# Patient Record
Sex: MALE | Race: WHITE | URBAN - METROPOLITAN AREA
[De-identification: names, ages, dates, MRNs, and addresses within clinical notes are randomized per-mention and may not be internally consistent; named-entity substitution may affect disease eponyms.]

---

## 2019-06-11 ENCOUNTER — OFFICE VISIT (OUTPATIENT)
Dept: FAMILY MEDICINE CLINIC | Facility: CLINIC | Age: 54
End: 2019-06-11
Payer: COMMERCIAL

## 2019-06-11 VITALS
HEART RATE: 96 BPM | TEMPERATURE: 99 F | OXYGEN SATURATION: 98 % | WEIGHT: 251.38 LBS | SYSTOLIC BLOOD PRESSURE: 130 MMHG | DIASTOLIC BLOOD PRESSURE: 82 MMHG | HEIGHT: 69.5 IN | BODY MASS INDEX: 36.39 KG/M2

## 2019-06-11 DIAGNOSIS — I10 ESSENTIAL HYPERTENSION: ICD-10-CM

## 2019-06-11 DIAGNOSIS — Z00.00 ROUTINE GENERAL MEDICAL EXAMINATION AT A HEALTH CARE FACILITY: Primary | ICD-10-CM

## 2019-06-11 DIAGNOSIS — Z12.11 SCREENING FOR COLON CANCER: ICD-10-CM

## 2019-06-11 DIAGNOSIS — G47.09 OTHER INSOMNIA: ICD-10-CM

## 2019-06-11 PROCEDURE — 99203 OFFICE O/P NEW LOW 30 MIN: CPT | Performed by: FAMILY MEDICINE

## 2019-06-11 PROCEDURE — 99386 PREV VISIT NEW AGE 40-64: CPT | Performed by: FAMILY MEDICINE

## 2019-06-11 RX ORDER — LISINOPRIL AND HYDROCHLOROTHIAZIDE 20; 12.5 MG/1; MG/1
1 TABLET ORAL
Qty: 90 TABLET | Refills: 1 | Status: SHIPPED | OUTPATIENT
Start: 2019-06-11 | End: 2021-11-24

## 2019-06-11 RX ORDER — LISINOPRIL AND HYDROCHLOROTHIAZIDE 20; 12.5 MG/1; MG/1
1 TABLET ORAL
Refills: 1 | COMMUNITY
Start: 2019-06-05 | End: 2019-06-11

## 2019-06-11 RX ORDER — TRAZODONE HYDROCHLORIDE 50 MG/1
TABLET ORAL
Qty: 90 TABLET | Refills: 0 | Status: SHIPPED | OUTPATIENT
Start: 2019-06-11 | End: 2021-11-24

## 2019-06-11 NOTE — PATIENT INSTRUCTIONS
-- come in for fasting bloodwork anytime that you are able to (8-10h no food, only water; lab here is open M-F, 8am-12)  -- go to [facility]  -- we will call with results about 5-7 days after bloodwork is completed     -when at [address] [place] [address], you can  stool ca

## 2019-06-11 NOTE — PROGRESS NOTES
Boone Dawson is a 48year old male who is here for Patient presents with:  Hypertension: Patient got dx: HTN 06/05/2019 and is here for follow up.  He refused Colonoscopy, he agreed to get Hemoccult done       HPI:     HTN  -tolerating meds  -diagnosed 1 SYSTEMS:     GENERAL HEALTH: feels well otherwise.  No f/c  NEURO: denies any headaches, LH, dizzyness, LOC, falls  VISION: denies any blurred or double vision  RESPIRATORY: denies shortness of breath, cough, or congestion  CARDIOVASCULAR: denies chest pain (Jhoan Linn, et al., 2013) failed to calculate for the following reasons:    Cannot find a previous HDL lab    Cannot find a previous total cholesterol lab    ASSESSMENT AND PLAN:     Health Maintenance  -We discussed the following:  Healthy diet and exer

## 2020-09-30 ENCOUNTER — PATIENT OUTREACH (OUTPATIENT)
Dept: FAMILY MEDICINE CLINIC | Facility: CLINIC | Age: 55
End: 2020-09-30

## 2021-04-23 ENCOUNTER — PATIENT OUTREACH (OUTPATIENT)
Dept: FAMILY MEDICINE CLINIC | Facility: CLINIC | Age: 56
End: 2021-04-23

## 2021-07-28 ENCOUNTER — PATIENT OUTREACH (OUTPATIENT)
Dept: FAMILY MEDICINE CLINIC | Facility: CLINIC | Age: 56
End: 2021-07-28

## 2021-11-09 ENCOUNTER — PATIENT OUTREACH (OUTPATIENT)
Dept: FAMILY MEDICINE CLINIC | Facility: CLINIC | Age: 56
End: 2021-11-09

## 2021-11-24 ENCOUNTER — OFFICE VISIT (OUTPATIENT)
Dept: FAMILY MEDICINE CLINIC | Facility: CLINIC | Age: 56
End: 2021-11-24
Payer: COMMERCIAL

## 2021-11-24 VITALS
DIASTOLIC BLOOD PRESSURE: 88 MMHG | OXYGEN SATURATION: 96 % | RESPIRATION RATE: 18 BRPM | HEART RATE: 88 BPM | WEIGHT: 264 LBS | HEIGHT: 71 IN | TEMPERATURE: 97 F | BODY MASS INDEX: 36.96 KG/M2 | SYSTOLIC BLOOD PRESSURE: 160 MMHG

## 2021-11-24 DIAGNOSIS — R09.89 RUNNY NOSE: ICD-10-CM

## 2021-11-24 DIAGNOSIS — R03.0 ELEVATED BLOOD PRESSURE READING: ICD-10-CM

## 2021-11-24 DIAGNOSIS — Z20.822 ENCOUNTER FOR LABORATORY TESTING FOR COVID-19 VIRUS: ICD-10-CM

## 2021-11-24 DIAGNOSIS — R09.81 NASAL CONGESTION: Primary | ICD-10-CM

## 2021-11-24 PROCEDURE — 3077F SYST BP >= 140 MM HG: CPT | Performed by: FAMILY MEDICINE

## 2021-11-24 PROCEDURE — 3008F BODY MASS INDEX DOCD: CPT | Performed by: FAMILY MEDICINE

## 2021-11-24 PROCEDURE — 3079F DIAST BP 80-89 MM HG: CPT | Performed by: FAMILY MEDICINE

## 2021-11-24 PROCEDURE — 99213 OFFICE O/P EST LOW 20 MIN: CPT | Performed by: FAMILY MEDICINE

## 2021-11-24 NOTE — PROGRESS NOTES
Guerda Hood is a 49year old male. S:  Patient presents today with the following concerns:  · Nasal congestion, runny nose, slight fever, cough for 2 days. No N/V/D. No dyspnea. · Taking Tylenol and cold medicine.     · No history of covid infecti states this feels like a sinus infection to him. Discussed with him that symptoms only going on for 2 days and not appropriate to treat with antibiotic. Recommend Flonase nasal spray and Mucinex.       Fluids, steam, rest.    Go to ED with chest pain, dys

## 2021-11-24 NOTE — PATIENT INSTRUCTIONS
Viral Upper Respiratory Illness (Adult)    You have a viral upper respiratory illness (URI), which is another term for the common cold. This illness is contagious during the first few days. It is spread through the air by coughing and sneezing.  It may al decongestants if you have high blood pressure.)  Follow-up care  Follow up with your healthcare provider, or as advised.   When to seek medical advice  Call your healthcare provider right away if any of these occur:  · Cough with lots of colored sputum (muc help thin nasal mucus and help your sinuses drain fluids. If you have any questions about an over-the-counter medicine or its side effects, talk with your healthcare provider or pharmacist before taking it.   · You can use an over-the-counter decongestant,  problems, such as blurred or double vision  · Fever of 100.4ºF (38ºC) or higher, or as directed by your provider  · Symptoms that don't go away in 10 days  Call 911  Call 911 if any of these occur:     · Seizure  · Trouble breathing  · Feeling dizzy or mariam

## 2022-06-02 ENCOUNTER — PATIENT OUTREACH (OUTPATIENT)
Dept: FAMILY MEDICINE CLINIC | Facility: CLINIC | Age: 57
End: 2022-06-02

## 2022-06-07 ENCOUNTER — PATIENT OUTREACH (OUTPATIENT)
Dept: FAMILY MEDICINE CLINIC | Facility: CLINIC | Age: 57
End: 2022-06-07

## 2022-09-26 ENCOUNTER — OFFICE VISIT (OUTPATIENT)
Dept: FAMILY MEDICINE CLINIC | Facility: CLINIC | Age: 57
End: 2022-09-26

## 2022-09-26 VITALS
HEIGHT: 69.29 IN | BODY MASS INDEX: 38.07 KG/M2 | DIASTOLIC BLOOD PRESSURE: 90 MMHG | OXYGEN SATURATION: 96 % | WEIGHT: 260 LBS | HEART RATE: 94 BPM | SYSTOLIC BLOOD PRESSURE: 172 MMHG | TEMPERATURE: 98 F

## 2022-09-26 DIAGNOSIS — Z13.228 SCREENING FOR ENDOCRINE, METABOLIC AND IMMUNITY DISORDER: ICD-10-CM

## 2022-09-26 DIAGNOSIS — Z13.29 SCREENING FOR ENDOCRINE, METABOLIC AND IMMUNITY DISORDER: ICD-10-CM

## 2022-09-26 DIAGNOSIS — F43.9 STRESS: ICD-10-CM

## 2022-09-26 DIAGNOSIS — Z00.00 ROUTINE GENERAL MEDICAL EXAMINATION AT A HEALTH CARE FACILITY: Primary | ICD-10-CM

## 2022-09-26 DIAGNOSIS — M54.2 NECK PAIN: ICD-10-CM

## 2022-09-26 DIAGNOSIS — R20.0 RIGHT ARM NUMBNESS: ICD-10-CM

## 2022-09-26 DIAGNOSIS — F17.200 TOBACCO DEPENDENCE: ICD-10-CM

## 2022-09-26 DIAGNOSIS — Z13.0 SCREENING FOR ENDOCRINE, METABOLIC AND IMMUNITY DISORDER: ICD-10-CM

## 2022-09-26 DIAGNOSIS — F10.10 ALCOHOL ABUSE: ICD-10-CM

## 2022-09-26 DIAGNOSIS — Z12.5 SCREENING FOR MALIGNANT NEOPLASM OF PROSTATE: ICD-10-CM

## 2022-09-26 DIAGNOSIS — I10 ESSENTIAL HYPERTENSION: ICD-10-CM

## 2022-09-26 LAB
ALBUMIN SERPL-MCNC: 3.9 G/DL (ref 3.4–5)
ALBUMIN/GLOB SERPL: 0.9 {RATIO} (ref 1–2)
ALP LIVER SERPL-CCNC: 128 U/L
ALT SERPL-CCNC: 37 U/L
ANION GAP SERPL CALC-SCNC: 7 MMOL/L (ref 0–18)
AST SERPL-CCNC: 24 U/L (ref 15–37)
BASOPHILS # BLD AUTO: 0.05 X10(3) UL (ref 0–0.2)
BASOPHILS NFR BLD AUTO: 0.6 %
BILIRUB SERPL-MCNC: 0.4 MG/DL (ref 0.1–2)
BUN BLD-MCNC: 21 MG/DL (ref 7–18)
CALCIUM BLD-MCNC: 9.4 MG/DL (ref 8.5–10.1)
CHLORIDE SERPL-SCNC: 106 MMOL/L (ref 98–112)
CHOLEST SERPL-MCNC: 166 MG/DL (ref ?–200)
CO2 SERPL-SCNC: 26 MMOL/L (ref 21–32)
COMPLEXED PSA SERPL-MCNC: 0.68 NG/ML (ref ?–4)
CREAT BLD-MCNC: 0.99 MG/DL
EOSINOPHIL # BLD AUTO: 0.18 X10(3) UL (ref 0–0.7)
EOSINOPHIL NFR BLD AUTO: 2.2 %
ERYTHROCYTE [DISTWIDTH] IN BLOOD BY AUTOMATED COUNT: 12.4 %
FASTING PATIENT LIPID ANSWER: NO
FASTING STATUS PATIENT QL REPORTED: NO
GFR SERPLBLD BASED ON 1.73 SQ M-ARVRAT: 90 ML/MIN/1.73M2 (ref 60–?)
GLOBULIN PLAS-MCNC: 4.4 G/DL (ref 2.8–4.4)
GLUCOSE BLD-MCNC: 120 MG/DL (ref 70–99)
HCT VFR BLD AUTO: 46.3 %
HDLC SERPL-MCNC: 51 MG/DL (ref 40–59)
HGB BLD-MCNC: 15.7 G/DL
IMM GRANULOCYTES # BLD AUTO: 0.03 X10(3) UL (ref 0–1)
IMM GRANULOCYTES NFR BLD: 0.4 %
LDLC SERPL CALC-MCNC: 87 MG/DL (ref ?–100)
LYMPHOCYTES # BLD AUTO: 1.77 X10(3) UL (ref 1–4)
LYMPHOCYTES NFR BLD AUTO: 21.9 %
MCH RBC QN AUTO: 32 PG (ref 26–34)
MCHC RBC AUTO-ENTMCNC: 33.9 G/DL (ref 31–37)
MCV RBC AUTO: 94.3 FL
MONOCYTES # BLD AUTO: 0.84 X10(3) UL (ref 0.1–1)
MONOCYTES NFR BLD AUTO: 10.4 %
NEUTROPHILS # BLD AUTO: 5.2 X10 (3) UL (ref 1.5–7.7)
NEUTROPHILS # BLD AUTO: 5.2 X10(3) UL (ref 1.5–7.7)
NEUTROPHILS NFR BLD AUTO: 64.5 %
NONHDLC SERPL-MCNC: 115 MG/DL (ref ?–130)
OSMOLALITY SERPL CALC.SUM OF ELEC: 292 MOSM/KG (ref 275–295)
PLATELET # BLD AUTO: 250 10(3)UL (ref 150–450)
POTASSIUM SERPL-SCNC: 4 MMOL/L (ref 3.5–5.1)
PROT SERPL-MCNC: 8.3 G/DL (ref 6.4–8.2)
RBC # BLD AUTO: 4.91 X10(6)UL
SODIUM SERPL-SCNC: 139 MMOL/L (ref 136–145)
TRIGL SERPL-MCNC: 165 MG/DL (ref 30–149)
TSI SER-ACNC: 0.45 MIU/ML (ref 0.36–3.74)
VLDLC SERPL CALC-MCNC: 26 MG/DL (ref 0–30)
WBC # BLD AUTO: 8.1 X10(3) UL (ref 4–11)

## 2022-09-26 PROCEDURE — 80053 COMPREHEN METABOLIC PANEL: CPT | Performed by: FAMILY MEDICINE

## 2022-09-26 PROCEDURE — 85025 COMPLETE CBC W/AUTO DIFF WBC: CPT | Performed by: FAMILY MEDICINE

## 2022-09-26 PROCEDURE — 84443 ASSAY THYROID STIM HORMONE: CPT | Performed by: FAMILY MEDICINE

## 2022-09-26 PROCEDURE — 80061 LIPID PANEL: CPT | Performed by: FAMILY MEDICINE

## 2022-09-26 RX ORDER — LISINOPRIL AND HYDROCHLOROTHIAZIDE 20; 12.5 MG/1; MG/1
1 TABLET ORAL
Qty: 90 TABLET | Refills: 1 | Status: SHIPPED | OUTPATIENT
Start: 2022-09-26

## 2022-09-26 NOTE — PROGRESS NOTES
Patient came in for draw of ordered  labs. Patient drawn out of left AC, x 1 attempt and tolerated well.  2 tube drawn.

## 2022-10-04 ENCOUNTER — HOSPITAL ENCOUNTER (OUTPATIENT)
Dept: GENERAL RADIOLOGY | Age: 57
Discharge: HOME | End: 2022-10-04
Attending: FAMILY MEDICINE
Payer: COMMERCIAL

## 2022-10-04 DIAGNOSIS — M54.2 NECK PAIN: ICD-10-CM

## 2022-10-04 DIAGNOSIS — R20.0 RIGHT ARM NUMBNESS: ICD-10-CM

## 2022-10-04 PROCEDURE — 72050 X-RAY EXAM NECK SPINE 4/5VWS: CPT | Performed by: FAMILY MEDICINE

## 2022-10-31 ENCOUNTER — OFFICE VISIT (OUTPATIENT)
Dept: FAMILY MEDICINE CLINIC | Facility: CLINIC | Age: 57
End: 2022-10-31
Payer: COMMERCIAL

## 2022-10-31 VITALS
DIASTOLIC BLOOD PRESSURE: 90 MMHG | HEART RATE: 74 BPM | OXYGEN SATURATION: 98 % | BODY MASS INDEX: 37.93 KG/M2 | HEIGHT: 69.29 IN | SYSTOLIC BLOOD PRESSURE: 162 MMHG | TEMPERATURE: 98 F | WEIGHT: 259 LBS

## 2022-10-31 DIAGNOSIS — F10.10 ALCOHOL ABUSE: ICD-10-CM

## 2022-10-31 DIAGNOSIS — I10 ESSENTIAL HYPERTENSION: Primary | ICD-10-CM

## 2022-10-31 DIAGNOSIS — F17.200 TOBACCO DEPENDENCE: ICD-10-CM

## 2022-10-31 DIAGNOSIS — F43.9 STRESS: ICD-10-CM

## 2022-10-31 DIAGNOSIS — Z12.11 ENCOUNTER FOR SCREENING FOR MALIGNANT NEOPLASM OF COLON: ICD-10-CM

## 2022-10-31 DIAGNOSIS — R20.0 RIGHT ARM NUMBNESS: ICD-10-CM

## 2022-10-31 PROCEDURE — 3008F BODY MASS INDEX DOCD: CPT | Performed by: FAMILY MEDICINE

## 2022-10-31 PROCEDURE — 3080F DIAST BP >= 90 MM HG: CPT | Performed by: FAMILY MEDICINE

## 2022-10-31 PROCEDURE — 3077F SYST BP >= 140 MM HG: CPT | Performed by: FAMILY MEDICINE

## 2022-10-31 PROCEDURE — 99214 OFFICE O/P EST MOD 30 MIN: CPT | Performed by: FAMILY MEDICINE

## 2022-10-31 NOTE — PATIENT INSTRUCTIONS
Start blood pressure medication once daily    Check BP at home - can look for Omron brand at [employer] or [employer]    Continue with lifestyle changes and weight loss    Ask work if [employer] forms are required     Complete stool cards at home    Otherwise, continue work schedule    Come back sooner if numbness worsening    Followup in 3 months

## 2023-02-27 ENCOUNTER — OFFICE VISIT (OUTPATIENT)
Dept: FAMILY MEDICINE CLINIC | Facility: CLINIC | Age: 58
End: 2023-02-27
Payer: COMMERCIAL

## 2023-02-27 VITALS
SYSTOLIC BLOOD PRESSURE: 138 MMHG | TEMPERATURE: 97 F | WEIGHT: 256 LBS | HEIGHT: 69.29 IN | DIASTOLIC BLOOD PRESSURE: 86 MMHG | OXYGEN SATURATION: 97 % | BODY MASS INDEX: 37.49 KG/M2 | HEART RATE: 84 BPM

## 2023-02-27 DIAGNOSIS — I10 ESSENTIAL HYPERTENSION: Primary | ICD-10-CM

## 2023-02-27 DIAGNOSIS — F10.10 ALCOHOL ABUSE: ICD-10-CM

## 2023-02-27 DIAGNOSIS — F17.200 TOBACCO DEPENDENCE: ICD-10-CM

## 2023-02-27 DIAGNOSIS — F43.9 STRESS: ICD-10-CM

## 2023-02-27 PROCEDURE — 3075F SYST BP GE 130 - 139MM HG: CPT | Performed by: FAMILY MEDICINE

## 2023-02-27 PROCEDURE — 3008F BODY MASS INDEX DOCD: CPT | Performed by: FAMILY MEDICINE

## 2023-02-27 PROCEDURE — 99214 OFFICE O/P EST MOD 30 MIN: CPT | Performed by: FAMILY MEDICINE

## 2023-02-27 PROCEDURE — 3079F DIAST BP 80-89 MM HG: CPT | Performed by: FAMILY MEDICINE

## 2023-08-21 ENCOUNTER — OFFICE VISIT (OUTPATIENT)
Dept: FAMILY MEDICINE CLINIC | Facility: CLINIC | Age: 58
End: 2023-08-21
Payer: COMMERCIAL

## 2023-08-21 VITALS
WEIGHT: 255 LBS | SYSTOLIC BLOOD PRESSURE: 136 MMHG | DIASTOLIC BLOOD PRESSURE: 80 MMHG | HEART RATE: 74 BPM | BODY MASS INDEX: 37 KG/M2 | OXYGEN SATURATION: 98 % | TEMPERATURE: 98 F

## 2023-08-21 DIAGNOSIS — F17.200 TOBACCO DEPENDENCE: ICD-10-CM

## 2023-08-21 DIAGNOSIS — F43.9 STRESS: ICD-10-CM

## 2023-08-21 DIAGNOSIS — F10.10 ALCOHOL ABUSE: ICD-10-CM

## 2023-08-21 DIAGNOSIS — I10 ESSENTIAL HYPERTENSION: Primary | ICD-10-CM

## 2023-10-11 ENCOUNTER — TELEPHONE (OUTPATIENT)
Dept: FAMILY MEDICINE CLINIC | Facility: CLINIC | Age: 58
End: 2023-10-11

## 2023-10-11 LAB — AMB EXT COVID-19 RESULT: DETECTED

## 2023-10-11 NOTE — TELEPHONE ENCOUNTER
Spoke with spouse, patient tested positive for Covid today. Symptoms started on yesterday and include headache/sinus pressure,  productive cough. Described symptoms as mild. Supportive care recommended for for management of symptoms, including plenty of water, rest, OTC products such as mucinex, flonase, tylenol. Continue to monitor, if new symptoms or worse, notify office. Educated on ER precautions for sudden onset of chest pain or shortness of breath. Quarantine guidelines per [facility]. Verbalized understanding.

## 2023-10-11 NOTE — TELEPHONE ENCOUNTER
Patient's spouse, Leland Sauceda calling to find out next steps for her  who see's Dr. Ailyn Charles who tested positive for COVID today. Pt's symptoms are: bad headache, body aches, fever, sinus congestion, cough. Symptoms started yesterday. What should pt do? Please advise.

## 2023-10-31 ENCOUNTER — OFFICE VISIT (OUTPATIENT)
Dept: FAMILY MEDICINE CLINIC | Facility: CLINIC | Age: 58
End: 2023-10-31

## 2023-10-31 VITALS
HEART RATE: 89 BPM | TEMPERATURE: 98 F | BODY MASS INDEX: 36.29 KG/M2 | WEIGHT: 247.81 LBS | SYSTOLIC BLOOD PRESSURE: 136 MMHG | OXYGEN SATURATION: 95 % | DIASTOLIC BLOOD PRESSURE: 80 MMHG | HEIGHT: 69.29 IN

## 2023-10-31 DIAGNOSIS — F17.200 TOBACCO DEPENDENCE: ICD-10-CM

## 2023-10-31 DIAGNOSIS — I10 ESSENTIAL HYPERTENSION: Primary | ICD-10-CM

## 2023-10-31 DIAGNOSIS — F10.10 ALCOHOL ABUSE: ICD-10-CM

## 2023-10-31 DIAGNOSIS — F43.9 STRESS: ICD-10-CM

## 2023-10-31 PROCEDURE — 3008F BODY MASS INDEX DOCD: CPT | Performed by: FAMILY MEDICINE

## 2023-10-31 PROCEDURE — 3075F SYST BP GE 130 - 139MM HG: CPT | Performed by: FAMILY MEDICINE

## 2023-10-31 PROCEDURE — 3079F DIAST BP 80-89 MM HG: CPT | Performed by: FAMILY MEDICINE

## 2023-10-31 PROCEDURE — 99214 OFFICE O/P EST MOD 30 MIN: CPT | Performed by: FAMILY MEDICINE

## 2024-02-02 ENCOUNTER — TELEPHONE (OUTPATIENT)
Dept: FAMILY MEDICINE CLINIC | Facility: CLINIC | Age: 59
End: 2024-02-02

## 2024-02-02 NOTE — TELEPHONE ENCOUNTER
LVM for pt. But phones are off for the weekend. Asked pt. To reply to [employer] message or call Monday morning for the letter after he is more clear with us on what it needs to say.

## 2024-02-02 NOTE — TELEPHONE ENCOUNTER
Patient requesting a note for work, to restrict from working below knees do to pain. Limiting hours at work.      Please advise.   Future Appointments   Date Time Provider Department Center   2/13/2024  9:00 AM Humble Grijalva MD [facility] [place]

## 2024-02-02 NOTE — TELEPHONE ENCOUNTER
I spoke w/ pt. He currently has restrictions for work limiting the number of hours he works. This is related to neck/back/shoulder pain. 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 He has a new supervisor who is working with the limits of his hour restriction but will not limit his actual physical duties. I asked if he could be as specific as possible but would only say that he should have restricted movement and manual labor below the knee and should work within his comfort zone. I did explain that employers tend to want something more specific but he said this was fine. He was hoping for letter today. I did explain that  is out of office and that since he has not been evaluated for knee pain yet that this likely will not be possible today. Pt said he will use a PTO day tomorrow and will then return Wednesday.   Pt does have appt 2/13. Please advise, thanks.

## 2024-02-02 NOTE — TELEPHONE ENCOUNTER
Patient calling back after reading [employer] message and would like to clarify and speak with nurse.

## 2024-02-05 NOTE — TELEPHONE ENCOUNTER
Spoke w/ pt. Advised him to keep his 2/13 OV and will sent work note to his My Chart. He did give some more specific verbiage to include in letter. Pt appreciative or letter.   Start date 2/5/24.  Should not spend more than 10% of shift working out of comfort/power zone-bending at the waist, kneeling, and working below knees. Re-evaluating 2/13/24.

## 2024-02-13 ENCOUNTER — MED REC SCAN ONLY (OUTPATIENT)
Dept: FAMILY MEDICINE CLINIC | Facility: CLINIC | Age: 59
End: 2024-02-13

## 2024-02-13 ENCOUNTER — TELEPHONE (OUTPATIENT)
Dept: FAMILY MEDICINE CLINIC | Facility: CLINIC | Age: 59
End: 2024-02-13

## 2024-02-13 ENCOUNTER — OFFICE VISIT (OUTPATIENT)
Dept: FAMILY MEDICINE CLINIC | Facility: CLINIC | Age: 59
End: 2024-02-13
Payer: COMMERCIAL

## 2024-02-13 VITALS
SYSTOLIC BLOOD PRESSURE: 138 MMHG | OXYGEN SATURATION: 95 % | WEIGHT: 244.38 LBS | TEMPERATURE: 98 F | HEIGHT: 69 IN | RESPIRATION RATE: 18 BRPM | HEART RATE: 74 BPM | BODY MASS INDEX: 36.2 KG/M2 | DIASTOLIC BLOOD PRESSURE: 86 MMHG

## 2024-02-13 DIAGNOSIS — F17.200 TOBACCO DEPENDENCE: ICD-10-CM

## 2024-02-13 DIAGNOSIS — F43.9 STRESS: ICD-10-CM

## 2024-02-13 DIAGNOSIS — M54.2 NECK PAIN: ICD-10-CM

## 2024-02-13 DIAGNOSIS — R20.0 RIGHT ARM NUMBNESS: ICD-10-CM

## 2024-02-13 DIAGNOSIS — I10 ESSENTIAL HYPERTENSION: ICD-10-CM

## 2024-02-13 DIAGNOSIS — Z23 NEED FOR VACCINATION: ICD-10-CM

## 2024-02-13 DIAGNOSIS — Z12.11 SCREENING FOR MALIGNANT NEOPLASM OF COLON: ICD-10-CM

## 2024-02-13 DIAGNOSIS — F10.10 ALCOHOL ABUSE: ICD-10-CM

## 2024-02-13 DIAGNOSIS — Z00.00 ROUTINE GENERAL MEDICAL EXAMINATION AT A HEALTH CARE FACILITY: Primary | ICD-10-CM

## 2024-02-13 LAB
ALBUMIN SERPL-MCNC: 3.9 G/DL (ref 3.4–5)
ALBUMIN/GLOB SERPL: 0.9 {RATIO} (ref 1–2)
ALP LIVER SERPL-CCNC: 91 U/L
ALT SERPL-CCNC: 24 U/L
ANION GAP SERPL CALC-SCNC: 4 MMOL/L (ref 0–18)
AST SERPL-CCNC: 17 U/L (ref 15–37)
BASOPHILS # BLD AUTO: 0.05 X10(3) UL (ref 0–0.2)
BASOPHILS NFR BLD AUTO: 0.8 %
BILIRUB SERPL-MCNC: 0.7 MG/DL (ref 0.1–2)
BUN BLD-MCNC: 13 MG/DL (ref 9–23)
CALCIUM BLD-MCNC: 9.1 MG/DL (ref 8.5–10.1)
CHLORIDE SERPL-SCNC: 105 MMOL/L (ref 98–112)
CHOLEST SERPL-MCNC: 179 MG/DL (ref ?–200)
CO2 SERPL-SCNC: 28 MMOL/L (ref 21–32)
CREAT BLD-MCNC: 0.8 MG/DL
EGFRCR SERPLBLD CKD-EPI 2021: 104 ML/MIN/1.73M2 (ref 60–?)
EOSINOPHIL # BLD AUTO: 0.15 X10(3) UL (ref 0–0.7)
EOSINOPHIL NFR BLD AUTO: 2.3 %
ERYTHROCYTE [DISTWIDTH] IN BLOOD BY AUTOMATED COUNT: 12.3 %
FASTING PATIENT LIPID ANSWER: YES
FASTING STATUS PATIENT QL REPORTED: YES
GLOBULIN PLAS-MCNC: 4.2 G/DL (ref 2.8–4.4)
GLUCOSE BLD-MCNC: 105 MG/DL (ref 70–99)
HCT VFR BLD AUTO: 46.6 %
HDLC SERPL-MCNC: 56 MG/DL (ref 40–59)
HGB BLD-MCNC: 16.1 G/DL
IMM GRANULOCYTES # BLD AUTO: 0.02 X10(3) UL (ref 0–1)
IMM GRANULOCYTES NFR BLD: 0.3 %
LDLC SERPL CALC-MCNC: 96 MG/DL (ref ?–100)
LYMPHOCYTES # BLD AUTO: 1.4 X10(3) UL (ref 1–4)
LYMPHOCYTES NFR BLD AUTO: 21.1 %
MCH RBC QN AUTO: 31.5 PG (ref 26–34)
MCHC RBC AUTO-ENTMCNC: 34.5 G/DL (ref 31–37)
MCV RBC AUTO: 91.2 FL
MONOCYTES # BLD AUTO: 0.54 X10(3) UL (ref 0.1–1)
MONOCYTES NFR BLD AUTO: 8.1 %
NEUTROPHILS # BLD AUTO: 4.47 X10 (3) UL (ref 1.5–7.7)
NEUTROPHILS # BLD AUTO: 4.47 X10(3) UL (ref 1.5–7.7)
NEUTROPHILS NFR BLD AUTO: 67.4 %
NONHDLC SERPL-MCNC: 123 MG/DL (ref ?–130)
OSMOLALITY SERPL CALC.SUM OF ELEC: 284 MOSM/KG (ref 275–295)
PLATELET # BLD AUTO: 206 10(3)UL (ref 150–450)
POTASSIUM SERPL-SCNC: 4 MMOL/L (ref 3.5–5.1)
PROT SERPL-MCNC: 8.1 G/DL (ref 6.4–8.2)
RBC # BLD AUTO: 5.11 X10(6)UL
SODIUM SERPL-SCNC: 137 MMOL/L (ref 136–145)
TRIGL SERPL-MCNC: 156 MG/DL (ref 30–149)
TSI SER-ACNC: 1.51 MIU/ML (ref 0.36–3.74)
VLDLC SERPL CALC-MCNC: 26 MG/DL (ref 0–30)
WBC # BLD AUTO: 6.6 X10(3) UL (ref 4–11)

## 2024-02-13 PROCEDURE — 85025 COMPLETE CBC W/AUTO DIFF WBC: CPT | Performed by: FAMILY MEDICINE

## 2024-02-13 PROCEDURE — 80061 LIPID PANEL: CPT | Performed by: FAMILY MEDICINE

## 2024-02-13 PROCEDURE — 99214 OFFICE O/P EST MOD 30 MIN: CPT | Performed by: FAMILY MEDICINE

## 2024-02-13 PROCEDURE — 84443 ASSAY THYROID STIM HORMONE: CPT | Performed by: FAMILY MEDICINE

## 2024-02-13 PROCEDURE — 3079F DIAST BP 80-89 MM HG: CPT | Performed by: FAMILY MEDICINE

## 2024-02-13 PROCEDURE — 90471 IMMUNIZATION ADMIN: CPT | Performed by: FAMILY MEDICINE

## 2024-02-13 PROCEDURE — 3075F SYST BP GE 130 - 139MM HG: CPT | Performed by: FAMILY MEDICINE

## 2024-02-13 PROCEDURE — 99396 PREV VISIT EST AGE 40-64: CPT | Performed by: FAMILY MEDICINE

## 2024-02-13 PROCEDURE — 90750 HZV VACC RECOMBINANT IM: CPT | Performed by: FAMILY MEDICINE

## 2024-02-13 PROCEDURE — 3008F BODY MASS INDEX DOCD: CPT | Performed by: FAMILY MEDICINE

## 2024-02-13 PROCEDURE — 80053 COMPREHEN METABOLIC PANEL: CPT | Performed by: FAMILY MEDICINE

## 2024-02-13 RX ORDER — TRAZODONE HYDROCHLORIDE 50 MG/1
TABLET ORAL NIGHTLY
Qty: 30 TABLET | Refills: 1 | Status: SHIPPED | OUTPATIENT
Start: 2024-02-13

## 2024-02-13 NOTE — PROGRESS NOTES
Charlie Jimenez is a 58 year old male who is here for   Chief Complaint   Patient presents with    Wellness Visit       HPI:     1. Routine general medical examination at a health care facility  2. Screening for endocrine, metabolic and immunity disorder  3. Screening for malignant neoplasm of prostate  -due for wellness    4. Neck pain  5. Right arm numbness  -working at [employer] , 4 x 10h shifts weekly  -notes worsening neck pain  -associated with numbness down right arm and into hand  -this has become constant for the past several weeks  -it is worsening, not improving  -aleve does help  -work makes it worse    6. Stress  -work is stress  -concerned about finances  -concerned about health  -feeling overwhelmed in general  -interested in counseling  -denies suicidal or homicidal ideation    7. Essential hypertension  -uncontrolled  -had been prescribed medication in past    8. Alcohol abuse  -drinking 12 beers 5x/wk  -knows he needs to cut back    9. Tobacco dependence  -down to 10 cigs/wk      Screening:  Diet: needs to improve  Exercise: needs to improve  Sleep: worse with neck/back pain  Depression/Anxiety: increased stress    Prostate CA - due  Colon CA - due    Works at [employer]       History   Smoking Status    Some Days    Packs/day: 0.25    Years: 25.00    Types: Cigarettes   Smokeless Tobacco    Current       Ready to quit: Not Answered  Counseling given: Not Answered  Tobacco comments: 5-8 cigarettes weekly      History   Alcohol Use    24.0 standard drinks of alcohol/week    24 Cans of beer per week     Comment: 24       History   Drug Use    Frequency: 4.0 times per week    Types: Cannabis         Pertinent Fam Hx:    History reviewed. No pertinent family history.    Social History     Socioeconomic History    Marital status:    Tobacco Use    Smoking status: Some Days     Packs/day: 0.25     Years: 25.00     Additional pack years: 0.00     Total pack years: 6.25      Types: Cigarettes    Smokeless tobacco: Current    Tobacco comments:     5-8 cigarettes weekly   Vaping Use    Vaping Use: Never used   Substance and Sexual Activity    Alcohol use: Yes     Alcohol/week: 24.0 standard drinks of alcohol     Types: 24 Cans of beer per week     Comment: 24    Drug use: Yes     Frequency: 4.0 times per week     Types: Cannabis   Other Topics Concern    Caffeine Concern Yes    Exercise Yes    Seat Belt Yes       Wt Readings from Last 6 Encounters:   02/13/24 244 lb 6.4 oz (110.9 kg)   10/31/23 247 lb 12.8 oz (112.4 kg)   08/21/23 255 lb (115.7 kg)   02/27/23 256 lb (116.1 kg)   10/31/22 259 lb (117.5 kg)   09/26/22 260 lb (117.9 kg)       There is no problem list on file for this patient.      Current Outpatient Medications on File Prior to Visit   Medication Sig Dispense Refill    Naproxen Sodium (ALEVE OR) Take 2 capsules by mouth daily.       No current facility-administered medications on file prior to visit.       REVIEW OF SYSTEMS:     See HPI for relevant ROS  GENERAL HEALTH: no other complaints  NEURO: no other complaints  VISION: no other complaints  RESPIRATORY: no other complaints  CARDIOVASCULAR: no other complaints  GI: no other complaints  : no other complaints  SKIN: no other complaints  PSYCH: no other complaints  EXT: no other complaints    Wt Readings from Last 6 Encounters:   02/13/24 244 lb 6.4 oz (110.9 kg)   10/31/23 247 lb 12.8 oz (112.4 kg)   08/21/23 255 lb (115.7 kg)   02/27/23 256 lb (116.1 kg)   10/31/22 259 lb (117.5 kg)   09/26/22 260 lb (117.9 kg)       No Known Allergies    There is no problem list on file for this patient.      History reviewed. No pertinent family history.   History reviewed. No pertinent past medical history.   Past Surgical History:   Procedure Laterality Date    BACK SURGERY  1999    [facility]      Social History:    Social History     Socioeconomic History    Marital status:    Tobacco Use    Smoking status: Some Days      Packs/day: 0.25     Years: 25.00     Additional pack years: 0.00     Total pack years: 6.25     Types: Cigarettes    Smokeless tobacco: Current    Tobacco comments:     5-8 cigarettes weekly   Vaping Use    Vaping Use: Never used   Substance and Sexual Activity    Alcohol use: Yes     Alcohol/week: 24.0 standard drinks of alcohol     Types: 24 Cans of beer per week     Comment: 24    Drug use: Yes     Frequency: 4.0 times per week     Types: Cannabis   Other Topics Concern    Caffeine Concern Yes    Exercise Yes    Seat Belt Yes           EXAM:   /86   Pulse 74   Temp 97.9 °F (36.6 °C) (Temporal)   Resp 18   Ht 5' 9\" (1.753 m)   Wt 244 lb 6.4 oz (110.9 kg)   SpO2 95%   BMI 36.09 kg/m²     GENERAL: A&O, in no apparent distress  HEENT: atraumatic, MMM, throat is clear  EYES: PERRLA, EOMI  NECK: supple, no thyromegaly  CHEST: no tenderness  LUNGS: clear to auscultation bilateraly, no c/w/r  CARDIO: RRR without murmurs  GI: soft, non-tender, non-distended, no appreciable hsm, bs throughout  NEURO: CN II-XII grossly intact  PSYCH: pleasant  MUSCULOSKELETAL: normal gait, no appreciable defects  EXTREMITIES: no cyanosis, clubbing or edema  SKIN: no rashes,no suspicious lesions    Problem focused exam (for problems outside of physical, if any):  Decreased sensation in right arm compared to left - strength is normal  Trapezius and paraspinal tension in right neck/upper back    The 10-year ASCVD risk score (Dillan DK, et al., 2019) is: 10.1%    Values used to calculate the score:      Age: 58 years      Sex: Male      Is Non- : No      Diabetic: No      Tobacco smoker: Yes      Systolic Blood Pressure: 138 mmHg      Is BP treated: No      HDL Cholesterol: 56 mg/dL      Total Cholesterol: 179 mg/dL    ASSESSMENT AND PLAN:     Health Maintenance  -We discussed the following:  Healthy diet and exercise, immunizations, and cancer screening    -Fasting labs ordered    Stress Management:  counseled  Lung Cancer Screening: (55 to 74 years, a history of smoking at least 30 pack-years and, if a former smoker, had quit within the previous 15 years) - will review at next visit    1. Routine general medical examination at a health care facility  2. Screening for endocrine, metabolic and immunity disorder  - CBC WITH DIFFERENTIAL WITH PLATELET; Future  - COMP METABOLIC PANEL (14); Future  - LIPID PANEL; Future  - TSH W REFLEX TO FREE T4; Future  - CBC WITH DIFFERENTIAL WITH PLATELET  - COMP METABOLIC PANEL (14)  - LIPID PANEL  - TSH W REFLEX TO FREE T4    3. Screening for malignant neoplasm of prostate  - PSA SCREEN; Future  - PSA SCREEN    4. Neck pain  5. Right arm numbness  -stable while staying within his limitations  -work note written for patient  -f/u in 6 months    6. Stress  -counseled on behavioral management     7. Essential hypertension  -better off meds  -will continue to monitor    8. Alcohol abuse  -encouraged importance of cutting back    9. Tobacco dependence  -encouraged importance of cutting back      Orders This Visit:  Orders Placed This Encounter   Procedures    CBC With Differential With Platelet    Comp Metabolic Panel (14)    Lipid Panel    TSH W Reflex To Free T4    Zoster Recombinant Adjuvanted (Shingrix -Shingles) [[id number]]       Meds This Visit:  Requested Prescriptions     Signed Prescriptions Disp Refills    traZODone 50 MG Oral Tab 30 tablet 1     Sig: Take 0.5-1 tablets (25-50 mg total) by mouth nightly.       Imaging & Referrals:  COLOGUARD COLON CANCER SCREENING (EXTERNAL)  ZOSTER VACC RECOMBINANT IM NJX     The patient indicates understanding of these issues and agrees to the plan.  The patient is asked to return in 4-6 wks.  CAPRI LAKHANI MD    I spent a total of 30 minutes, more than half of which was spent counseling/coordinating care regarding htn, neck pain, stress (outside of time for wellness)

## 2024-03-18 ENCOUNTER — TELEPHONE (OUTPATIENT)
Dept: FAMILY MEDICINE CLINIC | Facility: CLINIC | Age: 59
End: 2024-03-18

## 2024-03-18 DIAGNOSIS — R19.5 POSITIVE COLORECTAL CANCER SCREENING USING COLOGUARD TEST: ICD-10-CM

## 2024-03-18 DIAGNOSIS — Z12.11 SCREENING FOR MALIGNANT NEOPLASM OF COLON: Primary | ICD-10-CM

## 2024-03-18 NOTE — TELEPHONE ENCOUNTER
Kirby calling from [employer] in regards to a positive Cologuard result for pt.     Ref # [id number]    Please call back if needed.

## 2024-03-18 NOTE — TELEPHONE ENCOUNTER
Positive Cologuard results, please advise, thanks.     Cologuard  Specimen: Stool - Rectum structure (body structure)  Component  Ref Range & Units 6 d ago Comments   Test Result  Negative Positive Abnormal

## 2024-03-19 NOTE — TELEPHONE ENCOUNTER
Attempted to call patient, no answer    Please try again.    Cologuard positive - positive test doesn't necessarily mean something bad, it should be followed by a colonoscopy to be safe.  I have put in a referral for him to get this done.    I have also sent a [employer] message.

## 2024-08-12 ENCOUNTER — OFFICE VISIT (OUTPATIENT)
Dept: FAMILY MEDICINE CLINIC | Facility: CLINIC | Age: 59
End: 2024-08-12
Payer: COMMERCIAL

## 2024-08-12 VITALS
BODY MASS INDEX: 33.81 KG/M2 | RESPIRATION RATE: 18 BRPM | OXYGEN SATURATION: 97 % | DIASTOLIC BLOOD PRESSURE: 96 MMHG | WEIGHT: 236.19 LBS | HEART RATE: 66 BPM | HEIGHT: 70 IN | SYSTOLIC BLOOD PRESSURE: 158 MMHG | TEMPERATURE: 97 F

## 2024-08-12 DIAGNOSIS — I10 ESSENTIAL HYPERTENSION: Primary | ICD-10-CM

## 2024-08-12 DIAGNOSIS — R20.0 RIGHT ARM NUMBNESS: ICD-10-CM

## 2024-08-12 DIAGNOSIS — Z23 NEED FOR VACCINATION: ICD-10-CM

## 2024-08-12 DIAGNOSIS — G47.09 OTHER INSOMNIA: ICD-10-CM

## 2024-08-12 DIAGNOSIS — R59.0 ENLARGED LYMPH NODE IN NECK: ICD-10-CM

## 2024-08-12 DIAGNOSIS — F17.200 TOBACCO DEPENDENCE: ICD-10-CM

## 2024-08-12 PROCEDURE — 99215 OFFICE O/P EST HI 40 MIN: CPT | Performed by: FAMILY MEDICINE

## 2024-08-12 PROCEDURE — 3080F DIAST BP >= 90 MM HG: CPT | Performed by: FAMILY MEDICINE

## 2024-08-12 PROCEDURE — 3008F BODY MASS INDEX DOCD: CPT | Performed by: FAMILY MEDICINE

## 2024-08-12 PROCEDURE — 3077F SYST BP >= 140 MM HG: CPT | Performed by: FAMILY MEDICINE

## 2024-08-12 PROCEDURE — 90471 IMMUNIZATION ADMIN: CPT | Performed by: FAMILY MEDICINE

## 2024-08-12 PROCEDURE — 90750 HZV VACC RECOMBINANT IM: CPT | Performed by: FAMILY MEDICINE

## 2024-08-12 RX ORDER — NORTRIPTYLINE HYDROCHLORIDE 10 MG/1
CAPSULE ORAL NIGHTLY PRN
Qty: 60 CAPSULE | Refills: 1 | Status: SHIPPED | OUTPATIENT
Start: 2024-08-12

## 2024-08-12 NOTE — PATIENT INSTRUCTIONS
Look for Omron brand basic BP cuff to start checking BP at home  Try to check around the same time each day    Start nortriptyline for sleep as needed  Let me know if any issues with medication  If no effect, can increase to 2 tabs after 2-3 days     Followup in 6 wks

## 2024-08-12 NOTE — PROGRESS NOTES
Charlie Jimenez is a 59 year old male here for   Chief Complaint   Patient presents with    Follow - Up     6 month     Sleep Problem       HPI:       1. Essential hypertension  -bp slightly elevated today  -sleep is not good    2. Tobacco dependence  -cutting back  -down to 10 cigarettes per week    3. Right arm numbness  -improving    4. Other insomnia  -trazodone did not help  -felt off with it    5. Enlarged lymph node in neck  -started 1 wk ago, in right side of neck    6. Need for vaccination  -due for second dose - Zoster Vac (Shingrix) in office vaccine- Non-Medicare or Medicare with ABN        HISTORY:  Past Medical History:    Abdominal hernia    Arthritis    Back pain    Decorative tattoo    Flatulence/gas pain/belching    Hearing loss    Stress    Wears glasses      Past Surgical History:   Procedure Laterality Date    Back surgery  1999    [facility]    Spine surgery procedure unlisted        Family History   Problem Relation Age of Onset    Breast Cancer Mother     Alcohol and Other Disorders Associated Father       Social History:   Social History     Socioeconomic History    Marital status:    Tobacco Use    Smoking status: Some Days     Current packs/day: 0.25     Average packs/day: 0.3 packs/day for 25.2 years (6.3 ttl pk-yrs)     Types: Cigarettes    Smokeless tobacco: Current    Tobacco comments:     5-8 cigarettes weekly   Vaping Use    Vaping status: Never Used   Substance and Sexual Activity    Alcohol use: Yes     Alcohol/week: 48.0 standard drinks of alcohol     Types: 48 Cans of beer per week     Comment: 24    Drug use: Yes     Frequency: 4.0 times per week     Types: Cannabis   Other Topics Concern    Caffeine Concern Yes    Stress Concern Yes    Weight Concern No    Special Diet No    Exercise Yes    Seat Belt Yes        Medications (Active prior to today's visit):  Current Outpatient Medications   Medication Sig Dispense Refill    nortriptyline 10 MG Oral Cap Take 1-2  capsules (10-20 mg total) by mouth nightly as needed. 60 capsule 1       Allergies:  No Known Allergies      ROS:   See HPI for relevant ROS    --GEN: No other complaints  --HEENT: No other complaints  --RESP: No other complaints  --CV: No other complaints  --GI: No other complaints  --MSK: No other complaints    All other systems reviewed are negative    PHYSICAL EXAM:   BP (!) 158/96 (BP Location: Left arm, Patient Position: Sitting, Cuff Size: adult)   Pulse 66   Temp 96.5 °F (35.8 °C) (Temporal)   Resp 18   Ht 5' 10\" (1.778 m)   Wt 236 lb 3.2 oz (107.1 kg)   SpO2 97%   BMI 33.89 kg/m²     Gen: NAD  HEENT: NCAT, pupils equal and round ~1cm firm nodule in right posterior neck, nontender  Pulm: CTAB, no wheezing  CV: RRR  Ext: full ROM  Psych: normal affect     ASSESSMENT/PLAN:     1. Essential hypertension  -elevated  -not sleeping  -he will get BP cuff at home to track  -we will work on sleep  -he will followup in 6 wks    2. Tobacco dependence  -c/w cutting back  -he is doing well    3. Right arm numbness  -improving with work restriction  -updated work letter    4. Other insomnia  -off trazodone  -start nortripytline nightly - can uptitrate to 20 then 30mg if needed  -he will reach out with side effects    5. Enlarged lymph node in neck  -will continue to monitor  -consider US if not improving    6. Need for vaccination  - Zoster Vac (Shingrix) in office vaccine- Non-Medicare or Medicare with ABN        Chronic Conditions:    No problem-specific Assessment & Plan notes found for this encounter.       Health Maintenance:  Health Maintenance   Topic Date Due    DTaP,Tdap,and Td Vaccines (1 - Tdap) Never done    COVID-19 Vaccine (1 - 2023-24 season) Never done    Tobacco Cessation Counseling  Never done    Zoster Vaccines (2 of 2) 04/09/2024    HTN: BP Follow-Up  09/12/2024    PSA  09/26/2024    Influenza Vaccine (1) 10/01/2024    Annual Physical  02/13/2025    Colorectal Cancer Screening  03/12/2027     Annual Depression Screening  Completed    Pneumococcal Vaccine: Birth to 64yrs  Aged Out               The patient is asked to return in 6 wks.    Orders This Visit:  Orders Placed This Encounter   Procedures    Zoster Vac (Shingrix) in office vaccine- Non-Medicare or Medicare with [facility]       Meds This Visit:  Requested Prescriptions     Signed Prescriptions Disp Refills    nortriptyline 10 MG Oral Cap 60 capsule 1     Sig: Take 1-2 capsules (10-20 mg total) by mouth nightly as needed.       Imaging & Referrals:  ZOSTER VACC RECOMBINANT IM NJX     CAPRI LAKHANI MD    I spent a total of 40 minutes, more than half of which was spent counseling/coordinating care regarding htn, smoking, arm, sleep, lymph node

## 2024-08-26 PROBLEM — Z12.11 SPECIAL SCREENING FOR MALIGNANT NEOPLASM OF COLON: Status: ACTIVE | Noted: 2024-08-26

## 2024-08-26 PROBLEM — R19.5 OCCULT BLOOD IN STOOLS: Status: ACTIVE | Noted: 2024-08-26

## 2024-10-01 ENCOUNTER — OFFICE VISIT (OUTPATIENT)
Dept: FAMILY MEDICINE CLINIC | Facility: CLINIC | Age: 59
End: 2024-10-01
Payer: COMMERCIAL

## 2024-10-01 VITALS
DIASTOLIC BLOOD PRESSURE: 88 MMHG | OXYGEN SATURATION: 96 % | TEMPERATURE: 97 F | BODY MASS INDEX: 32.87 KG/M2 | HEART RATE: 78 BPM | HEIGHT: 70 IN | SYSTOLIC BLOOD PRESSURE: 152 MMHG | RESPIRATION RATE: 18 BRPM | WEIGHT: 229.63 LBS

## 2024-10-01 DIAGNOSIS — G47.09 OTHER INSOMNIA: ICD-10-CM

## 2024-10-01 DIAGNOSIS — R20.0 NUMBNESS AND TINGLING IN LEFT HAND: ICD-10-CM

## 2024-10-01 DIAGNOSIS — M25.532 LEFT WRIST PAIN: ICD-10-CM

## 2024-10-01 DIAGNOSIS — I10 ESSENTIAL HYPERTENSION: Primary | ICD-10-CM

## 2024-10-01 DIAGNOSIS — R20.2 NUMBNESS AND TINGLING IN LEFT HAND: ICD-10-CM

## 2024-10-01 DIAGNOSIS — M67.432 GANGLION CYST OF WRIST, LEFT: ICD-10-CM

## 2024-10-01 PROCEDURE — 3008F BODY MASS INDEX DOCD: CPT | Performed by: FAMILY MEDICINE

## 2024-10-01 PROCEDURE — 3079F DIAST BP 80-89 MM HG: CPT | Performed by: FAMILY MEDICINE

## 2024-10-01 PROCEDURE — 3077F SYST BP >= 140 MM HG: CPT | Performed by: FAMILY MEDICINE

## 2024-10-01 PROCEDURE — 99215 OFFICE O/P EST HI 40 MIN: CPT | Performed by: FAMILY MEDICINE

## 2024-10-01 PROCEDURE — G2211 COMPLEX E/M VISIT ADD ON: HCPCS | Performed by: FAMILY MEDICINE

## 2024-10-01 RX ORDER — ZOLPIDEM TARTRATE 5 MG/1
5 TABLET ORAL NIGHTLY PRN
Qty: 30 TABLET | Refills: 0 | Status: SHIPPED | OUTPATIENT
Start: 2024-10-01

## 2024-10-01 RX ORDER — LOSARTAN POTASSIUM 50 MG/1
50 TABLET ORAL DAILY
Qty: 90 TABLET | Refills: 1 | Status: SHIPPED | OUTPATIENT
Start: 2024-10-01

## 2024-10-01 NOTE — PROGRESS NOTES
Charlie Jimenez is a 59 year old male here for   Chief Complaint   Patient presents with    Test Results     Colonoscopy       Hypertension       HPI:       1. Essential hypertension  -remains elevated  -off lisinopril-hydrochlorothiazide due to diarrhea    2. Ganglion cyst of wrist, left  3. Left wrist pain  4. Numbness and tingling in left hand  -cyst is increasing in size    5. Other insomnia  -trazodone didn't help  -nortriptyline has no effect despite 30mg nightly  -he does note that ambien did help his sleep in the past        HISTORY:  Past Medical History:    Abdominal hernia    Arthritis    Back pain    Decorative tattoo    Flatulence/gas pain/belching    Hearing loss    Stress    Wears glasses      Past Surgical History:   Procedure Laterality Date    Back surgery  1999    [facility]    Colonoscopy  08/26/2024    Spine surgery procedure unlisted        Family History   Problem Relation Age of Onset    Breast Cancer Mother     Alcohol and Other Disorders Associated Father       Social History:   Social History     Socioeconomic History    Marital status:    Tobacco Use    Smoking status: Some Days     Current packs/day: 0.25     Average packs/day: 0.3 packs/day for 25.2 years (6.3 ttl pk-yrs)     Types: Cigarettes    Smokeless tobacco: Current    Tobacco comments:     5-8 cigarettes weekly   Vaping Use    Vaping status: Never Used   Substance and Sexual Activity    Alcohol use: Yes     Alcohol/week: 48.0 standard drinks of alcohol     Types: 48 Cans of beer per week     Comment: 24    Drug use: Yes     Frequency: 4.0 times per week     Types: Cannabis   Other Topics Concern    Caffeine Concern Yes    Stress Concern Yes    Weight Concern No    Special Diet No    Exercise Yes    Seat Belt Yes        Medications (Active prior to today's visit):  Current Outpatient Medications   Medication Sig Dispense Refill    zolpidem (AMBIEN) 5 MG Oral Tab Take 1 tablet (5 mg total) by mouth nightly as needed  for Sleep. 30 tablet 0    losartan 50 MG Oral Tab Take 1 tablet (50 mg total) by mouth daily. 90 tablet 1    nortriptyline 10 MG Oral Cap Take 1-2 capsules (10-20 mg total) by mouth nightly as needed. 60 capsule 1       Allergies:  No Known Allergies      ROS:   See HPI for relevant ROS    --GEN: No other complaints  --HEENT: No other complaints  --RESP: No other complaints  --CV: No other complaints  --GI: No other complaints  --MSK: No other complaints    All other systems reviewed are negative    PHYSICAL EXAM:   /88 (BP Location: Left arm, Patient Position: Sitting, Cuff Size: adult)   Pulse 78   Temp 97.2 °F (36.2 °C) (Temporal)   Resp 18   Ht 5' 10\" (1.778 m)   Wt 229 lb 9.6 oz (104.1 kg)   SpO2 96%   BMI 32.94 kg/m²     Gen: NAD  HEENT: NCAT, pupils equal and round  Pulm: CTAB, no wheezing  CV: RRR  Ext: full ROM; enlarging cyst in left wrist  Psych: normal affect     ASSESSMENT/PLAN:     1. Essential hypertension  -uncontrolled  -start losartan 50  -check bp at home  -f/u in 6 wks    2. Ganglion cyst of wrist, left  3. Left wrist pain  4. Numbness and tingling in left hand  -recommend ortho eval    - Ortho Referral - [facility]    5. Other insomnia  -failed trazodone and nortriptyline  -trial of ambien 2.5 - 5mg nightly prn  -f/u in 6 wks  -hope is that stress, BP will improve with better sleep  -recommend he consider sleep study as well     - zolpidem (AMBIEN) 5 MG Oral Tab; Take 1 tablet (5 mg total) by mouth nightly as needed for Sleep.  Dispense: 30 tablet; Refill: 0        Chronic Conditions:    No problem-specific Assessment & Plan notes found for this encounter.       Health Maintenance:  Health Maintenance   Topic Date Due    DTaP,Tdap,and Td Vaccines (1 - Tdap) Never done    Tobacco Cessation Counseling  Never done    COVID-19 Vaccine (1 - 2023-24 season) Never done    PSA  09/26/2024    Influenza Vaccine (1) 06/30/2025 (Originally 10/1/2024)    HTN: BP Follow-Up  11/01/2024    Annual  Physical  02/13/2025    Colorectal Cancer Screening  08/26/2026    Annual Depression Screening  Completed    Zoster Vaccines  Completed    Pneumococcal Vaccine: Birth to 64yrs  Aged Out               The patient is asked to return in 6 wks.    Orders This Visit:  No orders of the defined types were placed in this encounter.      Meds This Visit:  Requested Prescriptions     Signed Prescriptions Disp Refills    zolpidem (AMBIEN) 5 MG Oral Tab 30 tablet 0     Sig: Take 1 tablet (5 mg total) by mouth nightly as needed for Sleep.    losartan 50 MG Oral Tab 90 tablet 1     Sig: Take 1 tablet (50 mg total) by mouth daily.       Imaging & Referrals:  ORTHOPEDIC - INTERNAL     CAPRI LAKHANI MD    I spent a total of 40 minutes, more than half of which was spent counseling/coordinating care regarding htn, cyst, sleep

## 2024-10-01 NOTE — PATIENT INSTRUCTIONS
Start losartan 50mg daily    Start ambien 2.5 - 5mg nightly as needed    Continue to work on lifestyle changes    Followup in 6 wks, sooner if needed

## 2024-11-19 ENCOUNTER — OFFICE VISIT (OUTPATIENT)
Dept: FAMILY MEDICINE CLINIC | Facility: CLINIC | Age: 59
End: 2024-11-19
Payer: COMMERCIAL

## 2024-11-19 VITALS
WEIGHT: 230.81 LBS | DIASTOLIC BLOOD PRESSURE: 99 MMHG | HEART RATE: 72 BPM | TEMPERATURE: 98 F | SYSTOLIC BLOOD PRESSURE: 166 MMHG | BODY MASS INDEX: 33.04 KG/M2 | HEIGHT: 70 IN | OXYGEN SATURATION: 95 %

## 2024-11-19 DIAGNOSIS — Z87.891 HISTORY OF NICOTINE DEPENDENCE: ICD-10-CM

## 2024-11-19 DIAGNOSIS — Z12.2 ENCOUNTER FOR SCREENING FOR LUNG CANCER: ICD-10-CM

## 2024-11-19 DIAGNOSIS — I10 ESSENTIAL HYPERTENSION: Primary | ICD-10-CM

## 2024-11-19 DIAGNOSIS — F43.9 STRESS: ICD-10-CM

## 2024-11-19 DIAGNOSIS — G47.09 OTHER INSOMNIA: ICD-10-CM

## 2024-11-19 PROCEDURE — 99215 OFFICE O/P EST HI 40 MIN: CPT | Performed by: FAMILY MEDICINE

## 2024-11-19 PROCEDURE — G2211 COMPLEX E/M VISIT ADD ON: HCPCS | Performed by: FAMILY MEDICINE

## 2024-11-19 PROCEDURE — 3077F SYST BP >= 140 MM HG: CPT | Performed by: FAMILY MEDICINE

## 2024-11-19 PROCEDURE — 3008F BODY MASS INDEX DOCD: CPT | Performed by: FAMILY MEDICINE

## 2024-11-19 PROCEDURE — 3080F DIAST BP >= 90 MM HG: CPT | Performed by: FAMILY MEDICINE

## 2024-11-19 NOTE — PROGRESS NOTES
Charlie Jimenez is a 59 year old male here for   Chief Complaint   Patient presents with    Hypertension       HPI:       1. Essential hypertension  -never started losartan  -bp still high, but not like before  -continues to try to watch diet  -weight remains down    2. Stress  -worse  -home situation not any better  -doesn't have any outlet   -not comfortable talking about his home issues with anyone else  -hasn't been able to speak to his wife in a week - as they get into an argument as soon as they start    3. Other insomnia  -better with ambien prn - not using nightly    4. Encounter for screening for lung cancer  5. History of nicotine dependence  -due for screening  -started smoking as teenager  -average about 3/4 ppd - translates to about 30 pack year hx or more        HISTORY:  Past Medical History:    Abdominal hernia    Arthritis    Back pain    Decorative tattoo    Flatulence/gas pain/belching    Hearing loss    Stress    Wears glasses      Past Surgical History:   Procedure Laterality Date    Back surgery  1999    [facility]    Colonoscopy  08/26/2024    Spine surgery procedure unlisted        Family History   Problem Relation Age of Onset    Breast Cancer Mother     Alcohol and Other Disorders Associated Father       Social History:   Social History     Socioeconomic History    Marital status:    Tobacco Use    Smoking status: Some Days     Current packs/day: 0.25     Average packs/day: 0.3 packs/day for 25.2 years (6.3 ttl pk-yrs)     Types: Cigarettes    Smokeless tobacco: Current    Tobacco comments:     5-8 cigarettes weekly   Vaping Use    Vaping status: Never Used   Substance and Sexual Activity    Alcohol use: Yes     Alcohol/week: 48.0 standard drinks of alcohol     Types: 48 Cans of beer per week     Comment: 24    Drug use: Yes     Frequency: 4.0 times per week     Types: Cannabis   Other Topics Concern    Caffeine Concern Yes    Stress Concern Yes    Weight Concern No    Special  Diet No    Exercise Yes    Seat Belt Yes        Medications (Active prior to today's visit):  Current Outpatient Medications   Medication Sig Dispense Refill    zolpidem (AMBIEN) 5 MG Oral Tab Take 1 tablet (5 mg total) by mouth nightly as needed for Sleep. 30 tablet 0    losartan 50 MG Oral Tab Take 1 tablet (50 mg total) by mouth daily. (Patient not taking: Reported on 11/19/2024) 90 tablet 1    nortriptyline 10 MG Oral Cap Take 1-2 capsules (10-20 mg total) by mouth nightly as needed. (Patient not taking: Reported on 11/19/2024) 60 capsule 1       Allergies:  Allergies[1]      ROS:   See HPI for relevant ROS    --GEN: No other complaints  --HEENT: No other complaints  --RESP: No other complaints  --CV: No other complaints  --GI: No other complaints  --MSK: No other complaints    All other systems reviewed are negative    PHYSICAL EXAM:   BP (!) 166/99 (BP Location: Left arm, Patient Position: Sitting, Cuff Size: adult)   Pulse 72   Temp 98 °F (36.7 °C) (Temporal)   Ht 5' 10\" (1.778 m)   Wt 230 lb 12.8 oz (104.7 kg)   SpO2 95%   BMI 33.12 kg/m²     Gen: NAD  HEENT: NCAT, pupils equal and round  Pulm: CTAB, no wheezing  CV: RRR  Ext: full ROM  Psych: normal affect     ASSESSMENT/PLAN:     1. Essential hypertension  -uncontrolled  -encouraged to start losartan - he has at home  -will start and track BP    2. Stress  -worse  -recommend counseling at length  -he is agreeable  -referral placed for navigator    -  NAVIGATOR    3. Other insomnia  -stable  -c/w ambien prn    4. Encounter for screening for lung cancer  - CT LUNG LD SCREENING(CPT=71271); Future    5. History of nicotine dependence  - CT LUNG LD SCREENING(CPT=71271); Future        Chronic Conditions:    No problem-specific Assessment & Plan notes found for this encounter.       Health Maintenance:  Health Maintenance   Topic Date Due    DTaP,Tdap,and Td Vaccines (1 - Tdap) Never done    Tobacco Cessation Counseling  Never done    COVID-19 Vaccine (1  - 2024-25 season) Never done    PSA  09/26/2024    Annual Physical  02/13/2025    Influenza Vaccine (1) 06/30/2025 (Originally 10/1/2024)    HTN: BP Follow-Up  12/19/2024    Colorectal Cancer Screening  08/26/2026    Annual Depression Screening  Completed    Zoster Vaccines  Completed    Pneumococcal Vaccine: Birth to 64yrs  Aged Out               The patient is asked to return in 3 months.    Orders This Visit:  No orders of the defined types were placed in this encounter.      Meds This Visit:  Requested Prescriptions      No prescriptions requested or ordered in this encounter       Imaging & Referrals:   NAVIGATOR  CT LUNG LD SCREENING(CPT=71271)     CAPRI LAKHANI MD    I spent a total of 40 minutes, more than half of which was spent counseling/coordinating care regarding htn, stress, sleep, lung ca scr       [1] No Known Allergies

## 2024-11-19 NOTE — PATIENT INSTRUCTIONS
Start losartan once daily    Consider counseling - I really think it would help - social will call with options

## 2024-11-20 ENCOUNTER — TELEPHONE (OUTPATIENT)
Age: 59
End: 2024-11-20

## 2024-11-20 NOTE — TELEPHONE ENCOUNTER
Hello,  Sorry I missed you - I am reaching out from the [facility] Navigation department, following up on an order from your provider's office to assist in connecting you with resources for care. If you would like to discuss this further, please give us a call back at [phone], or for more immediate assistance you can contact our 24-hour help line at [phone]. We look forward to hearing from you soon.

## 2024-11-27 ENCOUNTER — TELEPHONE (OUTPATIENT)
Age: 59
End: 2024-11-27

## 2024-11-27 NOTE — TELEPHONE ENCOUNTER
Hello,     The [facility] Navigation team has attempted to reach you regarding an order from Dr. Grijalva's office. We are reaching out in order to assist you in coordinating care and resources that may meet your needs. Please give our office a call at [phone]. For more immediate assistance you can contact our 24-hour help line at [phone]. We look forward to hearing from you soon.

## 2024-12-09 DIAGNOSIS — G47.09 OTHER INSOMNIA: ICD-10-CM

## 2024-12-10 ENCOUNTER — TELEPHONE (OUTPATIENT)
Dept: FAMILY MEDICINE CLINIC | Facility: CLINIC | Age: 59
End: 2024-12-10

## 2024-12-10 RX ORDER — ZOLPIDEM TARTRATE 5 MG/1
5 TABLET ORAL NIGHTLY PRN
Qty: 30 TABLET | Refills: 0 | Status: SHIPPED | OUTPATIENT
Start: 2024-12-10

## 2024-12-18 ENCOUNTER — PATIENT MESSAGE (OUTPATIENT)
Dept: FAMILY MEDICINE CLINIC | Facility: CLINIC | Age: 59
End: 2024-12-18

## 2025-02-04 ENCOUNTER — PATIENT MESSAGE (OUTPATIENT)
Dept: FAMILY MEDICINE CLINIC | Facility: CLINIC | Age: 60
End: 2025-02-04

## 2025-02-04 ENCOUNTER — OFFICE VISIT (OUTPATIENT)
Dept: FAMILY MEDICINE CLINIC | Facility: CLINIC | Age: 60
End: 2025-02-04
Payer: COMMERCIAL

## 2025-02-04 ENCOUNTER — TELEPHONE (OUTPATIENT)
Dept: FAMILY MEDICINE CLINIC | Facility: CLINIC | Age: 60
End: 2025-02-04

## 2025-02-04 ENCOUNTER — MED REC SCAN ONLY (OUTPATIENT)
Dept: FAMILY MEDICINE CLINIC | Facility: CLINIC | Age: 60
End: 2025-02-04

## 2025-02-04 VITALS
HEART RATE: 73 BPM | SYSTOLIC BLOOD PRESSURE: 152 MMHG | HEIGHT: 70 IN | BODY MASS INDEX: 33.76 KG/M2 | WEIGHT: 235.81 LBS | OXYGEN SATURATION: 97 % | DIASTOLIC BLOOD PRESSURE: 88 MMHG | TEMPERATURE: 98 F | RESPIRATION RATE: 18 BRPM

## 2025-02-04 DIAGNOSIS — G47.09 OTHER INSOMNIA: ICD-10-CM

## 2025-02-04 DIAGNOSIS — F43.9 STRESS: ICD-10-CM

## 2025-02-04 DIAGNOSIS — I10 ESSENTIAL HYPERTENSION: Primary | ICD-10-CM

## 2025-02-04 PROCEDURE — 99215 OFFICE O/P EST HI 40 MIN: CPT | Performed by: FAMILY MEDICINE

## 2025-02-04 PROCEDURE — 3079F DIAST BP 80-89 MM HG: CPT | Performed by: FAMILY MEDICINE

## 2025-02-04 PROCEDURE — G2211 COMPLEX E/M VISIT ADD ON: HCPCS | Performed by: FAMILY MEDICINE

## 2025-02-04 PROCEDURE — 3077F SYST BP >= 140 MM HG: CPT | Performed by: FAMILY MEDICINE

## 2025-02-04 PROCEDURE — 3008F BODY MASS INDEX DOCD: CPT | Performed by: FAMILY MEDICINE

## 2025-02-04 RX ORDER — LOSARTAN POTASSIUM 50 MG/1
50 TABLET ORAL DAILY
Qty: 90 TABLET | Refills: 1 | Status: CANCELLED | OUTPATIENT
Start: 2025-02-04

## 2025-02-04 RX ORDER — LOSARTAN POTASSIUM AND HYDROCHLOROTHIAZIDE 12.5; 5 MG/1; MG/1
1 TABLET ORAL DAILY
Qty: 90 TABLET | Refills: 1 | Status: SHIPPED | OUTPATIENT
Start: 2025-02-04 | End: 2026-01-30

## 2025-02-04 RX ORDER — ZOLPIDEM TARTRATE 5 MG/1
5 TABLET ORAL NIGHTLY PRN
Qty: 30 TABLET | Refills: 0 | Status: SHIPPED | OUTPATIENT
Start: 2025-02-04

## 2025-02-04 NOTE — PROGRESS NOTES
Charlie Jimenez is a 59 year old male here for   Chief Complaint   Patient presents with    Hypertension    Complete Form     FMLA        HPI:       1. Essential hypertension  -bp improving but still a little high  -improved on  recheck  -at home does get into 130s systolic  -but generally running 140-150s/80s    2. Stress  -still same at home    3. Other insomnia  -needs refill of ambien        HISTORY:  Past Medical History:    Abdominal hernia    Arthritis    Back pain    Decorative tattoo    Flatulence/gas pain/belching    Hearing loss    Stress    Wears glasses      Past Surgical History:   Procedure Laterality Date    Back surgery  1999    [facility]    Colonoscopy  08/26/2024    Spine surgery procedure unlisted        Family History   Problem Relation Age of Onset    Breast Cancer Mother     Alcohol and Other Disorders Associated Father       Social History:   Social History     Socioeconomic History    Marital status:    Tobacco Use    Smoking status: Some Days     Current packs/day: 0.25     Average packs/day: 0.3 packs/day for 25.2 years (6.3 ttl pk-yrs)     Types: Cigarettes    Smokeless tobacco: Current    Tobacco comments:     5-8 cigarettes weekly   Vaping Use    Vaping status: Never Used   Substance and Sexual Activity    Alcohol use: Yes     Alcohol/week: 48.0 standard drinks of alcohol     Types: 48 Cans of beer per week     Comment: 24    Drug use: Yes     Frequency: 4.0 times per week     Types: Cannabis   Other Topics Concern    Caffeine Concern Yes    Stress Concern Yes    Weight Concern No    Special Diet No    Exercise Yes    Seat Belt Yes        Medications (Active prior to today's visit):  Current Outpatient Medications   Medication Sig Dispense Refill    zolpidem (AMBIEN) 5 MG Oral Tab Take 1 tablet (5 mg total) by mouth nightly as needed for Sleep. 30 tablet 0    losartan-hydroCHLOROthiazide 50-12.5 MG Oral Tab Take 1 tablet by mouth daily. 90 tablet 1    nortriptyline 10 MG  Oral Cap Take 1-2 capsules (10-20 mg total) by mouth nightly as needed. (Patient not taking: Reported on 2/4/2025) 60 capsule 1       Allergies:  Allergies[1]      ROS:   See HPI for relevant ROS    --GEN: No other complaints  --HEENT: No other complaints  --RESP: No other complaints  --CV: No other complaints  --GI: No other complaints  --MSK: No other complaints    All other systems reviewed are negative    PHYSICAL EXAM:   /88 (BP Location: Left arm, Patient Position: Sitting, Cuff Size: adult)   Pulse 73   Temp 98.3 °F (36.8 °C) (Temporal)   Resp 18   Ht 5' 10\" (1.778 m)   Wt 235 lb 12.8 oz (107 kg)   SpO2 97%   BMI 33.83 kg/m²     Gen: NAD  HEENT: NCAT, pupils equal and round  Pulm: CTAB, no wheezing  CV: RRR  Ext: full ROM  Psych: normal affect     ASSESSMENT/PLAN:     1. Essential hypertension  -stop losartan 50  -start losartan-hydrochlorothiazide 50-12.5  -check bp at home  -f/u in 3 months    2. Stress  -still high at home  -but he is trying to focus on himself and limit interactions that make stress worse    3. Other insomnia  -refilled ambien    - zolpidem (AMBIEN) 5 MG Oral Tab; Take 1 tablet (5 mg total) by mouth nightly as needed for Sleep.  Dispense: 30 tablet; Refill: 0    FMLA forms completed with patient      Chronic Conditions:    No problem-specific Assessment & Plan notes found for this encounter.       Health Maintenance:  Health Maintenance   Topic Date Due    DTaP,Tdap,and Td Vaccines (1 - Tdap) Never done    Pneumococcal Vaccine: 50+ Years (1 of 1 - PCV) Never done    COVID-19 Vaccine (1 - 2024-25 season) Never done    PSA  09/26/2024    Tobacco Cessation Counseling  Never done    Annual Physical  02/13/2025    HTN: BP Follow-Up  03/04/2025    Influenza Vaccine (1) 06/30/2025 (Originally 10/1/2024)    Colorectal Cancer Screening  08/26/2026    Annual Depression Screening  Completed    Zoster Vaccines  Completed    Meningococcal B Vaccine  Aged Out               The patient is  asked to return in 3 months.    Orders This Visit:  No orders of the defined types were placed in this encounter.      Meds This Visit:  Requested Prescriptions     Signed Prescriptions Disp Refills    zolpidem (AMBIEN) 5 MG Oral Tab 30 tablet 0     Sig: Take 1 tablet (5 mg total) by mouth nightly as needed for Sleep.    losartan-hydroCHLOROthiazide 50-12.5 MG Oral Tab 90 tablet 1     Sig: Take 1 tablet by mouth daily.       Imaging & Referrals:  None     CAPRI LAKHANI MD    I spent a total of 40 minutes, more than half of which was spent counseling/coordinating care regarding htn, stress, sleep, fmla       [1] No Known Allergies

## 2025-02-14 ENCOUNTER — PATIENT MESSAGE (OUTPATIENT)
Dept: FAMILY MEDICINE CLINIC | Facility: CLINIC | Age: 60
End: 2025-02-14

## 2025-02-18 ENCOUNTER — MED REC SCAN ONLY (OUTPATIENT)
Dept: FAMILY MEDICINE CLINIC | Facility: CLINIC | Age: 60
End: 2025-02-18

## 2025-02-18 ENCOUNTER — TELEPHONE (OUTPATIENT)
Dept: FAMILY MEDICINE CLINIC | Facility: CLINIC | Age: 60
End: 2025-02-18

## 2025-03-29 DIAGNOSIS — G47.09 OTHER INSOMNIA: ICD-10-CM

## 2025-03-31 RX ORDER — ZOLPIDEM TARTRATE 5 MG/1
5 TABLET ORAL NIGHTLY PRN
Qty: 30 TABLET | Refills: 0 | Status: SHIPPED | OUTPATIENT
Start: 2025-03-31

## 2025-03-31 NOTE — TELEPHONE ENCOUNTER
Requested Prescriptions     Pending Prescriptions Disp Refills    zolpidem (AMBIEN) 5 MG Oral Tab 30 tablet 0     Sig: Take 1 tablet (5 mg total) by mouth nightly as needed for Sleep.       Last Refill: 2/4    Last OV: 2/4    Next OV: 5/13

## 2025-05-06 RX ORDER — LOSARTAN POTASSIUM AND HYDROCHLOROTHIAZIDE 12.5; 5 MG/1; MG/1
1 TABLET ORAL DAILY
Qty: 90 TABLET | Refills: 1 | Status: SHIPPED | OUTPATIENT
Start: 2025-05-06 | End: 2026-05-01

## 2025-05-06 NOTE — TELEPHONE ENCOUNTER
Requested Prescriptions     Pending Prescriptions Disp Refills    losartan-hydroCHLOROthiazide 50-12.5 MG Oral Tab 90 tablet 1     Sig: Take 1 tablet by mouth daily.       Last Refill: 2/4    Last OV: 2/4    Next OV: 5/13

## 2025-05-13 ENCOUNTER — OFFICE VISIT (OUTPATIENT)
Dept: FAMILY MEDICINE CLINIC | Facility: CLINIC | Age: 60
End: 2025-05-13
Payer: COMMERCIAL

## 2025-05-13 VITALS
HEART RATE: 82 BPM | BODY MASS INDEX: 32.78 KG/M2 | HEIGHT: 70 IN | WEIGHT: 229 LBS | SYSTOLIC BLOOD PRESSURE: 132 MMHG | DIASTOLIC BLOOD PRESSURE: 86 MMHG | RESPIRATION RATE: 18 BRPM | TEMPERATURE: 98 F | OXYGEN SATURATION: 97 %

## 2025-05-13 DIAGNOSIS — F43.9 STRESS: ICD-10-CM

## 2025-05-13 DIAGNOSIS — Z71.6 ENCOUNTER FOR TOBACCO USE CESSATION COUNSELING: ICD-10-CM

## 2025-05-13 DIAGNOSIS — Z00.00 ROUTINE GENERAL MEDICAL EXAMINATION AT A HEALTH CARE FACILITY: ICD-10-CM

## 2025-05-13 DIAGNOSIS — G47.09 OTHER INSOMNIA: ICD-10-CM

## 2025-05-13 DIAGNOSIS — I10 ESSENTIAL HYPERTENSION: Primary | ICD-10-CM

## 2025-05-13 PROCEDURE — G2211 COMPLEX E/M VISIT ADD ON: HCPCS | Performed by: FAMILY MEDICINE

## 2025-05-13 PROCEDURE — 3008F BODY MASS INDEX DOCD: CPT | Performed by: FAMILY MEDICINE

## 2025-05-13 PROCEDURE — 99214 OFFICE O/P EST MOD 30 MIN: CPT | Performed by: FAMILY MEDICINE

## 2025-05-13 PROCEDURE — 3075F SYST BP GE 130 - 139MM HG: CPT | Performed by: FAMILY MEDICINE

## 2025-05-13 PROCEDURE — 3079F DIAST BP 80-89 MM HG: CPT | Performed by: FAMILY MEDICINE

## 2025-05-13 NOTE — PATIENT INSTRUCTIONS
Consider scheduling lung scan    Go to lab 1 wk before next appt for fasting bloodwork    See me in 3 months for wellness    Continue all medications as prescribed

## 2025-05-13 NOTE — PROGRESS NOTES
Charlie Jimenez is a 59 year old male here for   Chief Complaint   Patient presents with    Hypertension       HPI:       1. Essential hypertension  -at goal  -watching diet  -weight improving    2. Other insomnia  -better    3. Stress  -still the same but is dealing with it    4. Encounter for tobacco use cessation counseling  -has quit smoking 2 months so far!          HISTORY:  Past Medical History[1]   Past Surgical History[2]   Family History[3]   Social History: Short Social Hx on File[4]     Medications (Active prior to today's visit):  Current Medications[5]    Allergies:  Allergies[6]      ROS:   See HPI for relevant ROS    --GEN: No other complaints  --HEENT: No other complaints  --RESP: No other complaints  --CV: No other complaints  --GI: No other complaints  --MSK: No other complaints    All other systems reviewed are negative    PHYSICAL EXAM:   /86 (BP Location: Right arm, Patient Position: Sitting, Cuff Size: adult)   Pulse 82   Temp 97.6 °F (36.4 °C) (Temporal)   Resp 18   Ht 5' 10\" (1.778 m)   Wt 229 lb (103.9 kg)   SpO2 97%   BMI 32.86 kg/m²     Gen: NAD  HEENT: NCAT, pupils equal and round  Pulm: CTAB, no wheezing  CV: RRR  Ext: full ROM  Psych: normal affect     ASSESSMENT/PLAN:     1. Essential hypertension  -at goal  -c/w meds  -c/w lifestyle changes    2. Other insomnia  -better  -c/w ambien prn - rarely using    3. Stress  -stable - will continue to monitor    4. Encounter for tobacco use cessation counseling  -has quit  -encouraged continued cessation          Chronic Conditions:    No problem-specific Assessment & Plan notes found for this encounter.       Health Maintenance:  Health Maintenance   Topic Date Due    DTaP,Tdap,and Td Vaccines (1 - Tdap) Never done    Pneumococcal Vaccine: 50+ Years (1 of 1 - PCV) Never done    COVID-19 Vaccine (1 - 2024-25 season) Never done    PSA  09/26/2024    Tobacco Cessation Counseling  Never done    Annual Physical  02/13/2025     Influenza Vaccine (Season Ended) 10/01/2025    Colorectal Cancer Screening  2026    Annual Depression Screening  Completed    Zoster Vaccines  Completed    Meningococcal B Vaccine  Aged Out               The patient is asked to return in 3 months for wellness.    Orders This Visit:  Orders Placed This Encounter   Procedures    CBC With Differential With Platelet    Comp Metabolic Panel (14)    Lipid Panel    PSA Total, Screen    TSH W Reflex To Free T4       Meds This Visit:  Requested Prescriptions      No prescriptions requested or ordered in this encounter       Imaging & Referrals:  None     CAPRI LAKHANI MD    I spent a total of 30 minutes, more than half of which was spent counseling/coordinating care regarding htn, sleep, stress       [1]   Past Medical History:   Abdominal hernia    Arthritis    Back pain    Decorative tattoo    Flatulence/gas pain/belching    Hearing loss    Stress    Wears glasses   [2]   Past Surgical History:  Procedure Laterality Date    Back surgery      [facility]    Colonoscopy  2024    Spine surgery procedure unlisted     [3]   Family History  Problem Relation Age of Onset    Breast Cancer Mother     Alcohol and Other Disorders Associated Father    [4]   Social History  Socioeconomic History    Marital status:    Tobacco Use    Smoking status: Former     Current packs/day: 0.00     Average packs/day: 0.3 packs/day for 25.2 years (6.3 ttl pk-yrs)     Types: Cigarettes     Quit date: 2025     Years since quittin.2    Smokeless tobacco: Current    Tobacco comments:     5-8 cigarettes weekly   Vaping Use    Vaping status: Never Used   Substance and Sexual Activity    Alcohol use: Yes    Drug use: Yes     Frequency: 4.0 times per week     Types: Cannabis   Other Topics Concern    Caffeine Concern Yes    Stress Concern Yes    Weight Concern No    Special Diet No    Exercise Yes    Seat Belt Yes   [5]   Current Outpatient Medications   Medication Sig  Dispense Refill    losartan-hydroCHLOROthiazide 50-12.5 MG Oral Tab Take 1 tablet by mouth daily. 90 tablet 1    zolpidem (AMBIEN) 5 MG Oral Tab Take 1 tablet (5 mg total) by mouth nightly as needed for Sleep. 30 tablet 0   [6] No Known Allergies

## 2025-06-16 DIAGNOSIS — G47.09 OTHER INSOMNIA: ICD-10-CM

## 2025-06-16 NOTE — TELEPHONE ENCOUNTER
Requested Prescriptions     Pending Prescriptions Disp Refills    zolpidem (AMBIEN) 5 MG Oral Tab 30 tablet 0     Sig: Take 1 tablet (5 mg total) by mouth nightly as needed for Sleep.       Last Refill: 3/31    Last OV: 5/13    Next OV: 8/26

## 2025-06-17 RX ORDER — ZOLPIDEM TARTRATE 5 MG/1
5 TABLET ORAL NIGHTLY PRN
Qty: 30 TABLET | Refills: 0 | Status: SHIPPED | OUTPATIENT
Start: 2025-06-17

## 2025-08-11 RX ORDER — LOSARTAN POTASSIUM AND HYDROCHLOROTHIAZIDE 12.5; 5 MG/1; MG/1
1 TABLET ORAL DAILY
Qty: 90 TABLET | Refills: 1 | OUTPATIENT
Start: 2025-08-11 | End: 2026-08-06

## 2025-08-26 ENCOUNTER — LAB ENCOUNTER (OUTPATIENT)
Dept: LAB | Age: 60
End: 2025-08-26
Attending: FAMILY MEDICINE

## 2025-08-26 ENCOUNTER — OFFICE VISIT (OUTPATIENT)
Dept: FAMILY MEDICINE CLINIC | Facility: CLINIC | Age: 60
End: 2025-08-26

## 2025-08-26 ENCOUNTER — PATIENT MESSAGE (OUTPATIENT)
Dept: FAMILY MEDICINE CLINIC | Facility: CLINIC | Age: 60
End: 2025-08-26

## 2025-08-26 ENCOUNTER — TELEPHONE (OUTPATIENT)
Dept: FAMILY MEDICINE CLINIC | Facility: CLINIC | Age: 60
End: 2025-08-26

## 2025-08-26 VITALS
HEART RATE: 91 BPM | RESPIRATION RATE: 18 BRPM | WEIGHT: 225.19 LBS | TEMPERATURE: 98 F | DIASTOLIC BLOOD PRESSURE: 82 MMHG | OXYGEN SATURATION: 96 % | BODY MASS INDEX: 32.6 KG/M2 | SYSTOLIC BLOOD PRESSURE: 128 MMHG | HEIGHT: 69.5 IN

## 2025-08-26 DIAGNOSIS — M54.2 CHRONIC NECK PAIN: ICD-10-CM

## 2025-08-26 DIAGNOSIS — G89.29 CHRONIC BILATERAL LOW BACK PAIN WITH RIGHT-SIDED SCIATICA: ICD-10-CM

## 2025-08-26 DIAGNOSIS — M54.41 CHRONIC BILATERAL LOW BACK PAIN WITH RIGHT-SIDED SCIATICA: ICD-10-CM

## 2025-08-26 DIAGNOSIS — M79.641 BILATERAL HAND PAIN: ICD-10-CM

## 2025-08-26 DIAGNOSIS — I10 ESSENTIAL HYPERTENSION: ICD-10-CM

## 2025-08-26 DIAGNOSIS — G89.29 CHRONIC NECK PAIN: ICD-10-CM

## 2025-08-26 DIAGNOSIS — Z00.00 ROUTINE GENERAL MEDICAL EXAMINATION AT A HEALTH CARE FACILITY: Primary | ICD-10-CM

## 2025-08-26 DIAGNOSIS — F43.9 STRESS: ICD-10-CM

## 2025-08-26 DIAGNOSIS — G47.09 OTHER INSOMNIA: ICD-10-CM

## 2025-08-26 DIAGNOSIS — M79.642 BILATERAL HAND PAIN: ICD-10-CM

## 2025-08-26 DIAGNOSIS — Z00.00 ROUTINE GENERAL MEDICAL EXAMINATION AT A HEALTH CARE FACILITY: ICD-10-CM

## 2025-08-26 LAB
ALBUMIN SERPL-MCNC: 4.7 G/DL (ref 3.2–4.8)
ALBUMIN/GLOB SERPL: 1.5 (ref 1–2)
ALP LIVER SERPL-CCNC: 76 U/L (ref 45–117)
ALT SERPL-CCNC: 18 U/L (ref 10–49)
ANION GAP SERPL CALC-SCNC: 12 MMOL/L (ref 0–18)
AST SERPL-CCNC: 21 U/L (ref ?–34)
BASOPHILS # BLD AUTO: 0.04 X10(3) UL (ref 0–0.2)
BASOPHILS NFR BLD AUTO: 0.8 %
BILIRUB SERPL-MCNC: 0.7 MG/DL (ref 0.3–1.2)
BUN BLD-MCNC: 10 MG/DL (ref 9–23)
CALCIUM BLD-MCNC: 10 MG/DL (ref 8.7–10.6)
CHLORIDE SERPL-SCNC: 100 MMOL/L (ref 98–112)
CHOLEST SERPL-MCNC: 152 MG/DL (ref ?–200)
CO2 SERPL-SCNC: 26 MMOL/L (ref 21–32)
COMPLEXED PSA SERPL-MCNC: 0.59 NG/ML (ref ?–4)
CREAT BLD-MCNC: 0.91 MG/DL (ref 0.7–1.3)
CRP SERPL-MCNC: <0.5 MG/DL (ref ?–0.5)
EGFRCR SERPLBLD CKD-EPI 2021: 98 ML/MIN/1.73M2 (ref 60–?)
EOSINOPHIL # BLD AUTO: 0.23 X10(3) UL (ref 0–0.7)
EOSINOPHIL NFR BLD AUTO: 4.5 %
ERYTHROCYTE [DISTWIDTH] IN BLOOD BY AUTOMATED COUNT: 12.6 %
ERYTHROCYTE [SEDIMENTATION RATE] IN BLOOD: 13 MM/HR (ref 0–20)
FASTING PATIENT LIPID ANSWER: YES
FASTING STATUS PATIENT QL REPORTED: YES
GLOBULIN PLAS-MCNC: 3.2 G/DL (ref 2–3.5)
GLUCOSE BLD-MCNC: 107 MG/DL (ref 70–99)
HCT VFR BLD AUTO: 43.1 % (ref 39–53)
HDLC SERPL-MCNC: 64 MG/DL (ref 40–59)
HGB BLD-MCNC: 14.9 G/DL (ref 13–17.5)
IMM GRANULOCYTES # BLD AUTO: 0.01 X10(3) UL (ref 0–1)
IMM GRANULOCYTES NFR BLD: 0.2 %
LDLC SERPL CALC-MCNC: 76 MG/DL (ref ?–100)
LYMPHOCYTES # BLD AUTO: 1.39 X10(3) UL (ref 1–4)
LYMPHOCYTES NFR BLD AUTO: 27.5 %
MCH RBC QN AUTO: 32.6 PG (ref 26–34)
MCHC RBC AUTO-ENTMCNC: 34.6 G/DL (ref 31–37)
MCV RBC AUTO: 94.3 FL (ref 80–100)
MONOCYTES # BLD AUTO: 0.61 X10(3) UL (ref 0.1–1)
MONOCYTES NFR BLD AUTO: 12.1 %
NEUTROPHILS # BLD AUTO: 2.78 X10 (3) UL (ref 1.5–7.7)
NEUTROPHILS # BLD AUTO: 2.78 X10(3) UL (ref 1.5–7.7)
NEUTROPHILS NFR BLD AUTO: 54.9 %
NONHDLC SERPL-MCNC: 88 MG/DL (ref ?–130)
OSMOLALITY SERPL CALC.SUM OF ELEC: 286 MOSM/KG (ref 275–295)
PLATELET # BLD AUTO: 242 10(3)UL (ref 150–450)
POTASSIUM SERPL-SCNC: 4.8 MMOL/L (ref 3.5–5.1)
PROT SERPL-MCNC: 7.9 G/DL (ref 5.7–8.2)
RBC # BLD AUTO: 4.57 X10(6)UL (ref 4.3–5.7)
RHEUMATOID FACT SERPL-ACNC: <3.5 IU/ML (ref ?–14)
SODIUM SERPL-SCNC: 138 MMOL/L (ref 136–145)
T3FREE SERPL-MCNC: 2.63 PG/ML (ref 2.4–4.2)
T4 FREE SERPL-MCNC: 1.2 NG/DL (ref 0.8–1.7)
TRIGL SERPL-MCNC: 59 MG/DL (ref 30–149)
TSI SER-ACNC: 0.49 UIU/ML (ref 0.55–4.78)
URATE SERPL-MCNC: 7.1 MG/DL (ref 3.7–9.2)
VLDLC SERPL CALC-MCNC: 9 MG/DL (ref 0–30)
WBC # BLD AUTO: 5.1 X10(3) UL (ref 4–11)

## 2025-08-26 PROCEDURE — 84153 ASSAY OF PSA TOTAL: CPT | Performed by: FAMILY MEDICINE

## 2025-08-26 PROCEDURE — 85652 RBC SED RATE AUTOMATED: CPT | Performed by: FAMILY MEDICINE

## 2025-08-26 PROCEDURE — 80061 LIPID PANEL: CPT | Performed by: FAMILY MEDICINE

## 2025-08-26 PROCEDURE — 84481 FREE ASSAY (FT-3): CPT | Performed by: FAMILY MEDICINE

## 2025-08-26 PROCEDURE — 86200 CCP ANTIBODY: CPT | Performed by: FAMILY MEDICINE

## 2025-08-26 PROCEDURE — 80050 GENERAL HEALTH PANEL: CPT | Performed by: FAMILY MEDICINE

## 2025-08-26 PROCEDURE — 84550 ASSAY OF BLOOD/URIC ACID: CPT | Performed by: FAMILY MEDICINE

## 2025-08-26 PROCEDURE — 86431 RHEUMATOID FACTOR QUANT: CPT | Performed by: FAMILY MEDICINE

## 2025-08-26 PROCEDURE — 84439 ASSAY OF FREE THYROXINE: CPT | Performed by: FAMILY MEDICINE

## 2025-08-26 PROCEDURE — 86140 C-REACTIVE PROTEIN: CPT | Performed by: FAMILY MEDICINE

## 2025-08-27 ENCOUNTER — PATIENT MESSAGE (OUTPATIENT)
Dept: FAMILY MEDICINE CLINIC | Facility: CLINIC | Age: 60
End: 2025-08-27

## 2025-08-27 ENCOUNTER — HOSPITAL ENCOUNTER (OUTPATIENT)
Dept: GENERAL RADIOLOGY | Age: 60
Discharge: HOME | End: 2025-08-27
Attending: FAMILY MEDICINE

## 2025-08-27 DIAGNOSIS — M54.41 CHRONIC BILATERAL LOW BACK PAIN WITH RIGHT-SIDED SCIATICA: ICD-10-CM

## 2025-08-27 DIAGNOSIS — G89.29 CHRONIC BILATERAL LOW BACK PAIN WITH RIGHT-SIDED SCIATICA: ICD-10-CM

## 2025-08-27 PROCEDURE — 72110 X-RAY EXAM L-2 SPINE 4/>VWS: CPT | Performed by: FAMILY MEDICINE

## 2025-08-29 LAB — CCP IGG SERPL-ACNC: 1.8 U/ML (ref 0–6.9)

## (undated) NOTE — LETTER
07/12/19        Maite Best  [address]      Dear Abena Figueroa,    [address] [facility] records indicate that you have outstanding lab work and or testing that was ordered for you and has not yet been completed:  Orders Placed This Encounter      CBC

## (undated) NOTE — LETTER
06/02/22        Garry Torres  [address] [address]                        Your health insurance has assigned Dr. Ivanna Guzman as your primary care provider; You are due for an annual wellness visit. Can you call our office at [phone] to make an appointment? If you are under the care of another provider, please call your health insurance company and let them know your current doctor's information and also call our office so we can update your file with the correct provider's name and information.       Sincerely,    [address] [facility]

## (undated) NOTE — LETTER
Date: 11/24/2021    Patient Name: Hardy Gotti          To Whom it may concern: This letter has been written at the patient's request. The above patient was seen at the [facility] for treatment of a medical condition.     This patient kateu

## (undated) NOTE — LETTER
Date: 2024    Patient Name: Charlie Jimenez   1965          To Whom it may concern:      While at work this patient should spend no more than 10% of the shift working out of his comfort/power zone. This would include bending at the waist, kneeling, and any work below the knees. These restrictions are to be continued through 24 upon which he will be re-evaluated.        Sincerely,    CAPRI LAKHANI MD

## (undated) NOTE — LETTER
Date: 8/21/2023    Patient Name: Radha Germain          To Whom it may concern: This patient should be limited to no more than 32 hours per week and no more than 8 hours per shift. This should continue from 8/21/23 to 2/21/24. We will re-evaluate in that time.          Sincerely,    Tracey Gardiner MD

## (undated) NOTE — LETTER
Date: 9/26/2022    Patient Name: Chelsy Espana          To Whom it may concern: The above patient was seen at the [facility] for treatment of a medical condition. This patient should be limited to no more than 32 hours per week and no more than 8 hours per shift. This should continue for at least 6 weeks from today. We will re-evaluate in that time.         Sincerely,    Monroe Machado MD

## (undated) NOTE — LETTER
Date: 8/12/2024    Patient Name: Charlie Jimenez          To Whom it may concern:      While at work this patient should spend no more than 10% of the shift working out of his comfort/power zone. This would include bending at the waist, kneeling, and any work below the knees.      He should also work no more than 8 hours per day, and no more than 32 hours per week.     These restrictions are to be continued through 2/13/25 upon which he will be re-evaluated.           Sincerely,    CAPRI LAKHANI MD

## (undated) NOTE — LETTER
06/07/22          Terrence Rice  [address] [facility] [address]                              Your health insurance has assigned Dr. Jodene Spatz as your primary care provider; You are due for an annual wellness visit. Can you call our office at [phone] to make an appointment? If you are under the care of another provider, please call your health insurance company and let them know your current doctor's information and also call our office so we can update your file with the correct provider's name and information.       Sincerely,    [facility]

## (undated) NOTE — LETTER
Date: 2/4/2025    Patient Name: Charlie Jimenez          To Whom it may concern:    While at work this patient should spend no more than 10% of the shift working out of his comfort/power zone. This would include bending at the waist, kneeling, and any work below the knees.      He should also work no more than 8 hours per day, and no more than 32 hours per week.     These restrictions are to be continued through 8/4/25 upon which he will be re-evaluated.         Sincerely,    CAPRI LAKHANI MD

## (undated) NOTE — LETTER
09/30/20    Dear Courtney Marsk,            In our system Dr. Neema Garay is shown to be your primary care provider, you are due for an Annual Wellness Visit. Can you please call our office to schedule appointment.      If you are being evaluated by another

## (undated) NOTE — LETTER
Date: 2/13/2024    Patient Name: Charlie Jimenez          To Whom it may concern:    While at work this patient should spend no more than 10% of the shift working out of his comfort/power zone. This would include bending at the waist, kneeling, and any work below the knees. These restrictions are to be continued through 8/13/24 upon which he will be re-evaluated.           Sincerely,    CAPRI LAKHANI MD

## (undated) NOTE — LETTER
Date: 2/13/2024    Patient Name: Charlie Jimenez          To Whom it may concern:    While at work this patient should spend no more than 10% of the shift working out of his comfort/power zone. This would include bending at the waist, kneeling, and any work below the knees.     He should also work no more than 8 hours per day, and no more than 32 hours per week.    These restrictions are to be continued through 8/13/24 upon which he will be re-evaluated.           Sincerely,    CAPRI LAKHANI MD